# Patient Record
Sex: MALE | Race: WHITE | NOT HISPANIC OR LATINO | URBAN - METROPOLITAN AREA
[De-identification: names, ages, dates, MRNs, and addresses within clinical notes are randomized per-mention and may not be internally consistent; named-entity substitution may affect disease eponyms.]

---

## 2017-03-28 NOTE — PATIENT PROFILE ADULT. - PMH
<<----- Click to add NO pertinent Past Medical History No pertinent past medical history HNP (herniated nucleus pulposus), lumbar

## 2017-03-29 ENCOUNTER — INPATIENT (INPATIENT)
Facility: HOSPITAL | Age: 32
LOS: 0 days | Discharge: HOME | DRG: 520 | End: 2017-03-29
Attending: ORTHOPAEDIC SURGERY | Admitting: ORTHOPAEDIC SURGERY
Payer: SUBSIDIZED

## 2017-03-29 VITALS
HEIGHT: 74 IN | WEIGHT: 211.2 LBS | SYSTOLIC BLOOD PRESSURE: 129 MMHG | HEART RATE: 129 BPM | OXYGEN SATURATION: 96 % | DIASTOLIC BLOOD PRESSURE: 80 MMHG | TEMPERATURE: 98 F | RESPIRATION RATE: 80 BRPM

## 2017-03-29 VITALS
RESPIRATION RATE: 16 BRPM | HEART RATE: 88 BPM | TEMPERATURE: 99 F | OXYGEN SATURATION: 100 % | SYSTOLIC BLOOD PRESSURE: 144 MMHG | DIASTOLIC BLOOD PRESSURE: 87 MMHG

## 2017-03-29 DIAGNOSIS — M51.26 OTHER INTERVERTEBRAL DISC DISPLACEMENT, LUMBAR REGION: ICD-10-CM

## 2017-03-29 DIAGNOSIS — Z90.49 ACQUIRED ABSENCE OF OTHER SPECIFIED PARTS OF DIGESTIVE TRACT: Chronic | ICD-10-CM

## 2017-03-29 DIAGNOSIS — Z98.890 OTHER SPECIFIED POSTPROCEDURAL STATES: Chronic | ICD-10-CM

## 2017-03-29 DIAGNOSIS — K40.90 UNILATERAL INGUINAL HERNIA, WITHOUT OBSTRUCTION OR GANGRENE, NOT SPECIFIED AS RECURRENT: Chronic | ICD-10-CM

## 2017-03-29 PROCEDURE — 95940 IONM IN OPERATNG ROOM 15 MIN: CPT

## 2017-03-29 PROCEDURE — 76000 FLUOROSCOPY <1 HR PHYS/QHP: CPT

## 2017-03-29 PROCEDURE — 88304 TISSUE EXAM BY PATHOLOGIST: CPT

## 2017-03-29 PROCEDURE — C1889: CPT

## 2017-03-29 RX ORDER — ACETAMINOPHEN 500 MG
650 TABLET ORAL EVERY 6 HOURS
Qty: 0 | Refills: 0 | Status: DISCONTINUED | OUTPATIENT
Start: 2017-03-29 | End: 2017-03-29

## 2017-03-29 RX ORDER — HYDROMORPHONE HYDROCHLORIDE 2 MG/ML
0.5 INJECTION INTRAMUSCULAR; INTRAVENOUS; SUBCUTANEOUS
Qty: 0 | Refills: 0 | Status: DISCONTINUED | OUTPATIENT
Start: 2017-03-29 | End: 2017-03-29

## 2017-03-29 RX ORDER — MAGNESIUM HYDROXIDE 400 MG/1
30 TABLET, CHEWABLE ORAL EVERY 12 HOURS
Qty: 0 | Refills: 0 | Status: DISCONTINUED | OUTPATIENT
Start: 2017-03-29 | End: 2017-03-29

## 2017-03-29 RX ORDER — ACETAMINOPHEN 500 MG
2 TABLET ORAL
Qty: 0 | Refills: 0 | COMMUNITY
Start: 2017-03-29

## 2017-03-29 RX ORDER — INFLUENZA VIRUS VACCINE 15; 15; 15; 15 UG/.5ML; UG/.5ML; UG/.5ML; UG/.5ML
0.5 SUSPENSION INTRAMUSCULAR ONCE
Qty: 0 | Refills: 0 | Status: COMPLETED | OUTPATIENT
Start: 2017-03-29 | End: 2017-03-29

## 2017-03-29 RX ORDER — DOCUSATE SODIUM 100 MG
100 CAPSULE ORAL THREE TIMES A DAY
Qty: 0 | Refills: 0 | Status: DISCONTINUED | OUTPATIENT
Start: 2017-03-29 | End: 2017-03-29

## 2017-03-29 RX ORDER — OXYCODONE HYDROCHLORIDE 5 MG/1
10 TABLET ORAL EVERY 4 HOURS
Qty: 0 | Refills: 0 | Status: DISCONTINUED | OUTPATIENT
Start: 2017-03-29 | End: 2017-03-29

## 2017-03-29 RX ORDER — ACETAMINOPHEN 500 MG
1000 TABLET ORAL ONCE
Qty: 0 | Refills: 0 | Status: DISCONTINUED | OUTPATIENT
Start: 2017-03-29 | End: 2017-03-29

## 2017-03-29 RX ORDER — SENNA PLUS 8.6 MG/1
2 TABLET ORAL AT BEDTIME
Qty: 0 | Refills: 0 | Status: DISCONTINUED | OUTPATIENT
Start: 2017-03-29 | End: 2017-03-29

## 2017-03-29 RX ORDER — BUPIVACAINE 13.3 MG/ML
20 INJECTION, SUSPENSION, LIPOSOMAL INFILTRATION ONCE
Qty: 0 | Refills: 0 | Status: DISCONTINUED | OUTPATIENT
Start: 2017-03-29 | End: 2017-03-29

## 2017-03-29 RX ORDER — OXYCODONE HYDROCHLORIDE 5 MG/1
5 TABLET ORAL EVERY 4 HOURS
Qty: 0 | Refills: 0 | Status: DISCONTINUED | OUTPATIENT
Start: 2017-03-29 | End: 2017-03-29

## 2017-03-29 RX ORDER — SODIUM CHLORIDE 9 MG/ML
1000 INJECTION, SOLUTION INTRAVENOUS
Qty: 0 | Refills: 0 | Status: DISCONTINUED | OUTPATIENT
Start: 2017-03-29 | End: 2017-03-29

## 2017-03-29 RX ORDER — ONDANSETRON 8 MG/1
4 TABLET, FILM COATED ORAL EVERY 6 HOURS
Qty: 0 | Refills: 0 | Status: DISCONTINUED | OUTPATIENT
Start: 2017-03-29 | End: 2017-03-29

## 2017-03-29 RX ORDER — TRAMADOL HYDROCHLORIDE 50 MG/1
1 TABLET ORAL
Qty: 0 | Refills: 0 | COMMUNITY

## 2017-03-29 RX ORDER — ACETAMINOPHEN 500 MG
100 TABLET ORAL
Qty: 0 | Refills: 0 | COMMUNITY
Start: 2017-03-29

## 2017-03-29 RX ADMIN — HYDROMORPHONE HYDROCHLORIDE 0.5 MILLIGRAM(S): 2 INJECTION INTRAMUSCULAR; INTRAVENOUS; SUBCUTANEOUS at 16:00

## 2017-03-29 RX ADMIN — OXYCODONE HYDROCHLORIDE 10 MILLIGRAM(S): 5 TABLET ORAL at 19:26

## 2017-03-29 RX ADMIN — HYDROMORPHONE HYDROCHLORIDE 0.5 MILLIGRAM(S): 2 INJECTION INTRAMUSCULAR; INTRAVENOUS; SUBCUTANEOUS at 16:30

## 2017-03-29 RX ADMIN — OXYCODONE HYDROCHLORIDE 10 MILLIGRAM(S): 5 TABLET ORAL at 20:00

## 2017-03-29 RX ADMIN — OXYCODONE HYDROCHLORIDE 5 MILLIGRAM(S): 5 TABLET ORAL at 21:40

## 2017-03-29 RX ADMIN — HYDROMORPHONE HYDROCHLORIDE 0.5 MILLIGRAM(S): 2 INJECTION INTRAMUSCULAR; INTRAVENOUS; SUBCUTANEOUS at 15:25

## 2017-03-29 RX ADMIN — OXYCODONE HYDROCHLORIDE 5 MILLIGRAM(S): 5 TABLET ORAL at 21:24

## 2017-03-29 NOTE — H&P ADULT - HISTORY OF PRESENT ILLNESS
32yo m c/o back pain x 1 year. Pt. states he woke up with back pain one day (denies injury) and consistently went to the gym afterwards. Pt. c/o shooting pain to his left buttock. Pt. uses Tramadol for pain relief. Pt. was scheduled for LISSETH tomorrow but was told by Dr. Duke it wasn't going to work and needed surgery. Pt. states he has difficulty walking and is now limping. Denies walker assistance. Denies any numbness/tingling in b/l les. Presents today for elective LEFT LAMINECTOMY/DISCECTOMY L4-L5.

## 2017-03-29 NOTE — DISCHARGE NOTE ADULT - PATIENT PORTAL LINK FT
“You can access the [facility] Patient Portal, offered by [facility], by registering with the following website: [email or web address]”

## 2017-03-29 NOTE — DISCHARGE NOTE ADULT - CARE PROVIDER_API CALL
Hernandez Duke), Orthopaedic Surgery  [address] [address]  Phone: [phone]  Fax: [phone]

## 2017-03-29 NOTE — DISCHARGE NOTE ADULT - MEDICATION SUMMARY - MEDICATIONS TO TAKE
I will START or STAY ON the medications listed below when I get home from the hospital:    naproxen 500 mg oral tablet  -- 1 tab(s) by mouth 2 times a day  -- Indication: For Pain med    traMADol 50 mg oral tablet  -- 1 tab(s) by mouth once a day, As Needed  -- Indication: For Pain med    acetaminophen-oxyCODONE 325 mg-10 mg oral tablet  -- 0.5-1 tab(s) by mouth every 4 hours, As Needed -for severe pain MDD:6  -- Caution federal law prohibits the transfer of this drug to any person other  than the person for whom it was prescribed.  May cause drowsiness.  Alcohol may intensify this effect.  Use care when operating dangerous machinery.  This prescription cannot be refilled.  This product contains acetaminophen.  Do not use  with any other product containing acetaminophen to prevent possible liver damage.  Using more of this medication than prescribed may cause serious breathing problems.    -- Indication: For Pain med - as prescribed by Pain management    acetaminophen 10 mg/mL intravenous solution  -- 100 milliliter(s) intravenous once  -- Indication: For STOP - NOT NEEDED    acetaminophen 325 mg oral tablet  -- 2 tab(s) by mouth every 6 hours, As needed, For Temp greater than 38 C (100.4 F)  -- Indication: For Pain med if needed

## 2017-03-29 NOTE — CONSULT NOTE ADULT - SUBJECTIVE AND OBJECTIVE BOX
Pain Management Consult Note - [facility] [phone]    Chief Complaint:  lumbar radiculopathy    HPI:30yo m c/o back pain x 1 year. Pt. states he woke up with back pain one day (denies injury) and consistently went to the gym afterwards. Pt. c/o shooting pain to his left buttock. Pt. uses Tramadol 50 mg BID for pain relief (stopped it 2 days ago). Pt. was scheduled for LISSETH tomorrow but was told by Dr. Duke it wasn't going to work and needed surgery. Pt. states he has difficulty walking and is now limping. Going to the OR today for laminectomy/discectomy L4-L5.      Pain is ___ sharp ____dull ___burning ___achy ___ Intensity: ____ mild ___mod ___severe "shooting"    Location ____surgical site ____cervical __x___lumbar ____abd ____upper ext____lower ext    Worse with __x__activity __x__movement _____physical therapy___ Rest    Improved with __x__medication __x__rest ____physical therapy    ROS: Const:  __-_febrile   Eyes:___ENT:_-__CV: _-__chest pain  Resp: _-___sob  GI:_-__nausea _-__vomiting _-__abd pain _+__npo ___clears __full diet __bm  :___ Musk: _+__pain ___spasm  Skin:_-__ Neuro:  _-__nvgmnjsa_-__vwbxddegq_-__ numbness _-__weakness ___paresth  Psych:__anxiety  Endo:_-__ Heme:_-__Allergy: NKDA_________, ___all others reviewed and negative    PAST MEDICAL & SURGICAL HISTORY:  HNP (herniated nucleus pulposus), lumbar  No pertinent past medical history  Testicular hernia: 13 years of age  History of appendectomy: 2003  H/O shoulder surgery: 2015      SH: _denies__Tobacco   __denies_Alcohol                          FH:FAMILY HISTORY:  grandfather-DM      BUpivacaine liposome 1.3% Injectable (no eMAR) 20milliLiter(s) Local Injection once      T(C): 36.7, Max: 36.7 (03-29 @ 10:50)  HR: 129 (129 - 129)  BP: 129/80 (129/80 - 129/80)  RR: 80 (80 - 80)  SpO2: 96% (96% - 96%)  Wt(kg): --    T(C): 36.7, Max: 36.7 (03-29 @ 10:50)  HR: 129 (129 - 129)  BP: 129/80 (129/80 - 129/80)  RR: 80 (80 - 80)  SpO2: 96% (96% - 96%)  Wt(kg): --    T(C): 36.7, Max: 36.7 (03-29 @ 10:50)  HR: 129 (129 - 129)  BP: 129/80 (129/80 - 129/80)  RR: 80 (80 - 80)  SpO2: 96% (96% - 96%)  Wt(kg): --    PHYSICAL EXAM:  Gen Appearance: __x_no acute distress __x_appropriate        Neuro: _x__SILT feet___x_ EOM Intact Psych: AAOX_3_, x___mood/affect appropriate        Eyes: _x__conjunctiva WNL  ___x__ Pupils equal and round        ENT: __x_ears and nose atraumatic__x_ Hearing grossly intact        Neck: ___trachea midline, no visible masses ___thyroid without palpable mass    Resp: _x__Nml WOB____No tactile fremitus ___clear to auscultation    Cardio: ___extremities free from edema ____pedal pulses palpable    GI/Abdomen: ___soft _____ Nontender______Nondistended_____HSM    Lymphatic: ___no palpable nodes in neck  ___no palpable nodes calves and feet    Skin/Wound: ___Incision, ___Dressing c/d/i,   ____surrounding tissues soft,  ___drain/chest tube present____    Muscular: EHL _5__/5  Gastrocnemius___/5    _x__absent clubbing/cyanosis      ASSESSMENT: This is a 31y old Male with a history of   HNP (herniated nucleus pulposus), lumbar: HNP (herniated nucleus pulposus), lumbar  Testicular hernia: 13 years of age  History of appendectomy: 2003  H/O shoulder surgery: 2015  and worsening lumbar radiculopathy and is going for laminectomy/discectomy L4-L5 today.    Recommended Treatment PLAN:  1.  Oxycodone 5-10 mg po q4h prn  2.  Dilaudid 0.5 mg IV q2h prn  3.  Tylenol 1000 mg IV x1

## 2017-03-29 NOTE — DISCHARGE NOTE ADULT - ADDITIONAL INSTRUCTIONS
No strenuous activity, heavy lifting, driving, tub bathing, or returning to work until cleared by MD.  You may shower--dressing is waterproof in 48hrs.   Remove dressing after post op day 7, then leave incision open to air.  Follow up with your doctor to schedule an appointment within 10-14 days.  If you don't have a bowel movement by post op day 3, then take Milk of Magnesia (over the counter).  If no bowel movement by at least post op day 5, then use a Dulcolax suppository (over the counter) and/or a Fleets enema--if still no bowel movement, call your MD.  Contact your doctor if you experience: fever greater than 101.5, chills, chest pain, difficulty breathing, bleeding, redness or heat around the incision.

## 2017-03-29 NOTE — DISCHARGE NOTE ADULT - HOSPITAL COURSE
Admitted  Surgery    Preeti-op Antibiotics  Pain control  DVT prophylaxis SCDs  OOB/Physical Therapy

## 2017-04-05 DIAGNOSIS — M54.5 LOW BACK PAIN: ICD-10-CM

## 2017-04-05 DIAGNOSIS — M51.26 OTHER INTERVERTEBRAL DISC DISPLACEMENT, LUMBAR REGION: ICD-10-CM

## 2017-04-05 DIAGNOSIS — Z90.49 ACQUIRED ABSENCE OF OTHER SPECIFIED PARTS OF DIGESTIVE TRACT: ICD-10-CM

## 2017-04-05 DIAGNOSIS — M48.06 SPINAL STENOSIS, LUMBAR REGION: ICD-10-CM

## 2017-04-06 LAB — SURGICAL PATHOLOGY STUDY: SIGNIFICANT CHANGE UP

## 2024-10-03 NOTE — DISCHARGE NOTE ADULT - VISION (WITH CORRECTIVE LENSES IF THE PATIENT USUALLY WEARS THEM):
Normal vision: sees adequately in most situations; can see medication labels, newsprint Render Risk Assessment In Note?: no Comment: Treated 2006 Detail Level: Simple Comment: Treated in 1981